# Patient Record
Sex: MALE | Race: BLACK OR AFRICAN AMERICAN | Employment: OTHER | URBAN - METROPOLITAN AREA
[De-identification: names, ages, dates, MRNs, and addresses within clinical notes are randomized per-mention and may not be internally consistent; named-entity substitution may affect disease eponyms.]

---

## 2019-03-05 ENCOUNTER — HOSPITAL ENCOUNTER (EMERGENCY)
Age: 44
Discharge: HOME | End: 2019-03-06
Attending: EMERGENCY MEDICINE
Payer: OTHER GOVERNMENT

## 2019-03-05 VITALS
DIASTOLIC BLOOD PRESSURE: 108 MMHG | BODY MASS INDEX: 28.42 KG/M2 | TEMPERATURE: 98 F | HEART RATE: 78 BPM | RESPIRATION RATE: 18 BRPM | WEIGHT: 203 LBS | OXYGEN SATURATION: 98 % | HEIGHT: 71 IN | SYSTOLIC BLOOD PRESSURE: 148 MMHG
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
## 2019-03-05 DIAGNOSIS — M54.6 ACUTE BILATERAL THORACIC BACK PAIN: ICD-10-CM

## 2019-03-05 DIAGNOSIS — L70.0 CYSTIC ACNE: ICD-10-CM

## 2019-03-05 PROCEDURE — 99282 EMERGENCY DEPT VISIT SF MDM: CPT

## 2019-03-05 RX ORDER — OLANZAPINE 15 MG/1
15 TABLET ORAL
COMMUNITY

## 2019-03-05 RX ORDER — ATORVASTATIN CALCIUM 40 MG/1
20 TABLET, FILM COATED ORAL DAILY
COMMUNITY

## 2019-03-05 RX ORDER — DIVALPROEX SODIUM 250 MG/1
750 TABLET, DELAYED RELEASE ORAL
COMMUNITY

## 2019-03-05 RX ORDER — METFORMIN HYDROCHLORIDE 500 MG/1
500 TABLET ORAL 2 TIMES DAILY WITH MEALS
COMMUNITY

## 2019-03-06 RX ORDER — IBUPROFEN 600 MG/1
600 TABLET ORAL
Qty: 20 TAB | Refills: 0 | Status: SHIPPED | OUTPATIENT
Start: 2019-03-06 | End: 2022-06-13 | Stop reason: SDUPTHER

## 2019-03-06 RX ORDER — METHOCARBAMOL 500 MG/1
750 TABLET, FILM COATED ORAL
Status: DISCONTINUED | OUTPATIENT
Start: 2019-03-06 | End: 2019-03-06 | Stop reason: HOSPADM

## 2019-03-06 RX ORDER — IBUPROFEN 600 MG/1
600 TABLET ORAL
Status: DISCONTINUED | OUTPATIENT
Start: 2019-03-06 | End: 2019-03-06 | Stop reason: HOSPADM
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
## 2022-06-12 ENCOUNTER — HOSPITAL ENCOUNTER (EMERGENCY)
Age: 47
Discharge: HOME | End: 2022-06-13
Attending: STUDENT IN AN ORGANIZED HEALTH CARE EDUCATION/TRAINING PROGRAM
Payer: OTHER GOVERNMENT

## 2022-06-12 ENCOUNTER — APPOINTMENT (OUTPATIENT)
Dept: GENERAL RADIOLOGY | Age: 47
End: 2022-06-12
Attending: STUDENT IN AN ORGANIZED HEALTH CARE EDUCATION/TRAINING PROGRAM
Payer: OTHER GOVERNMENT

## 2022-06-12 VITALS
SYSTOLIC BLOOD PRESSURE: 135 MMHG | BODY MASS INDEX: 23.8 KG/M2 | TEMPERATURE: 98.6 F | WEIGHT: 170 LBS | RESPIRATION RATE: 16 BRPM | OXYGEN SATURATION: 100 % | HEART RATE: 61 BPM | DIASTOLIC BLOOD PRESSURE: 87 MMHG | HEIGHT: 71 IN

## 2022-06-12 DIAGNOSIS — R45.851 SUICIDAL IDEATION: ICD-10-CM
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
## 2022-06-12 DIAGNOSIS — Z86.59 HISTORY OF POSTTRAUMATIC STRESS DISORDER (PTSD): ICD-10-CM

## 2022-06-12 DIAGNOSIS — U07.1 LAB TEST POSITIVE FOR DETECTION OF COVID-19 VIRUS: ICD-10-CM

## 2022-06-12 DIAGNOSIS — S62.102A CLOSED FRACTURE OF LEFT WRIST, INITIAL ENCOUNTER: Primary | ICD-10-CM

## 2022-06-12 LAB
AMPHET UR QL SCN: NEGATIVE
ANION GAP SERPL CALC-SCNC: 5 MMOL/L (ref 3–18)
APAP SERPL-MCNC: <2 UG/ML (ref 10–30)
ATRIAL RATE: 58 BPM
BARBITURATES UR QL SCN: NEGATIVE
BASOPHILS # BLD: 0 K/UL (ref 0–0.1)
BASOPHILS NFR BLD: 0 % (ref 0–2)
BENZODIAZ UR QL: NEGATIVE
BUN SERPL-MCNC: 13 MG/DL (ref 7–18)
BUN/CREAT SERPL: 13 (ref 12–20)
CALCIUM SERPL-MCNC: 9.1 MG/DL (ref 8.5–10.1)
CALCULATED P AXIS, ECG09: 61 DEGREES
CALCULATED R AXIS, ECG10: 35 DEGREES
CALCULATED T AXIS, ECG11: 24 DEGREES
CANNABINOIDS UR QL SCN: POSITIVE
CHLORIDE SERPL-SCNC: 109 MMOL/L (ref 100–111)
CO2 SERPL-SCNC: 26 MMOL/L (ref 21–32)
COCAINE UR QL SCN: NEGATIVE
COVID-19 RAPID TEST, COVR: NOT DETECTED
CREAT SERPL-MCNC: 1.04 MG/DL (ref 0.6–1.3)
DIAGNOSIS, 93000: NORMAL
DIFFERENTIAL METHOD BLD: ABNORMAL
EOSINOPHIL # BLD: 0 K/UL (ref 0–0.4)
EOSINOPHIL NFR BLD: 1 % (ref 0–5)
ERYTHROCYTE [DISTWIDTH] IN BLOOD BY AUTOMATED COUNT: 14.6 % (ref 11.6–14.5)
ETHANOL SERPL-MCNC: 7 MG/DL (ref 0–3)
GLUCOSE BLD STRIP.AUTO-MCNC: 78 MG/DL (ref 70–110)
GLUCOSE SERPL-MCNC: 180 MG/DL (ref 74–99)
HCT VFR BLD AUTO: 40.4 % (ref 36–48)
HDSCOM,HDSCOM: ABNORMAL
HGB BLD-MCNC: 13.4 G/DL (ref 13–16)
IMM GRANULOCYTES # BLD AUTO: 0 K/UL (ref 0–0.04)
IMM GRANULOCYTES NFR BLD AUTO: 0 % (ref 0–0.5)
LYMPHOCYTES # BLD: 1.2 K/UL (ref 0.9–3.6)
LYMPHOCYTES NFR BLD: 19 % (ref 21–52)
MCH RBC QN AUTO: 24.1 PG (ref 24–34)
MCHC RBC AUTO-ENTMCNC: 33.2 G/DL (ref 31–37)
MCV RBC AUTO: 72.5 FL (ref 78–100)
METHADONE UR QL: NEGATIVE
MONOCYTES # BLD: 0.6 K/UL (ref 0.05–1.2)
MONOCYTES NFR BLD: 9 % (ref 3–10)
NEUTS SEG # BLD: 4.5 K/UL (ref 1.8–8)
NEUTS SEG NFR BLD: 71 % (ref 40–73)
NRBC # BLD: 0 K/UL (ref 0–0.01)
NRBC BLD-RTO: 0 PER 100 WBC
OPIATES UR QL: NEGATIVE
P-R INTERVAL, ECG05: 164 MS
PCP UR QL: NEGATIVE
PLATELET # BLD AUTO: 223 K/UL (ref 135–420)
PMV BLD AUTO: 9.2 FL (ref 9.2–11.8)
POTASSIUM SERPL-SCNC: 4 MMOL/L (ref 3.5–5.5)
Q-T INTERVAL, ECG07: 404 MS
QRS DURATION, ECG06: 84 MS
QTC CALCULATION (BEZET), ECG08: 396 MS
RBC # BLD AUTO: 5.57 M/UL (ref 4.35–5.65)
SALICYLATES SERPL-MCNC: 2.4 MG/DL (ref 2.8–20)
SARS-COV-2, COV2: NORMAL
SODIUM SERPL-SCNC: 140 MMOL/L (ref 136–145)
SOURCE, COVRS: NORMAL
VENTRICULAR RATE, ECG03: 58 BPM
WBC # BLD AUTO: 6.3 K/UL (ref 4.6–13.2)

## 2022-06-12 PROCEDURE — 99285 EMERGENCY DEPT VISIT HI MDM: CPT

## 2022-06-12 PROCEDURE — 80143 DRUG ASSAY ACETAMINOPHEN: CPT

## 2022-06-12 PROCEDURE — 82962 GLUCOSE BLOOD TEST: CPT

## 2022-06-12 PROCEDURE — 74011250637 HC RX REV CODE- 250/637: Performed by: EMERGENCY MEDICINE

## 2022-06-12 PROCEDURE — 75810000053 HC SPLINT APPLICATION

## 2022-06-12 PROCEDURE — 87635 SARS-COV-2 COVID-19 AMP PRB: CPT

## 2022-06-12 PROCEDURE — 73110 X-RAY EXAM OF WRIST: CPT

## 2022-06-12 PROCEDURE — U0003 INFECTIOUS AGENT DETECTION BY NUCLEIC ACID (DNA OR RNA); SEVERE ACUTE RESPIRATORY SYNDROME CORONAVIRUS 2 (SARS-COV-2) (CORONAVIRUS DISEASE [COVID-19]), AMPLIFIED PROBE TECHNIQUE, MAKING USE OF HIGH THROUGHPUT TECHNOLOGIES AS DESCRIBED BY CMS-2020-01-R: HCPCS

## 2022-06-12 PROCEDURE — 82077 ASSAY SPEC XCP UR&BREATH IA: CPT

## 2022-06-12 PROCEDURE — 93005 ELECTROCARDIOGRAM TRACING: CPT

## 2022-06-12 PROCEDURE — 80179 DRUG ASSAY SALICYLATE: CPT

## 2022-06-12 PROCEDURE — 80048 BASIC METABOLIC PNL TOTAL CA: CPT

## 2022-06-12 PROCEDURE — 74011250637 HC RX REV CODE- 250/637: Performed by: STUDENT IN AN ORGANIZED HEALTH CARE EDUCATION/TRAINING PROGRAM

## 2022-06-12 PROCEDURE — 80307 DRUG TEST PRSMV CHEM ANLYZR: CPT

## 2022-06-12 PROCEDURE — 2709999900 HC NON-CHARGEABLE SUPPLY

## 2022-06-12 PROCEDURE — 85025 COMPLETE CBC W/AUTO DIFF WBC: CPT

## 2022-06-12 RX ORDER — ACETAMINOPHEN 500 MG
1000 TABLET ORAL ONCE
Status: COMPLETED | OUTPATIENT
Start: 2022-06-12 | End: 2022-06-12

## 2022-06-12 RX ORDER — IBUPROFEN 600 MG/1
600 TABLET ORAL
Status: COMPLETED | OUTPATIENT
Start: 2022-06-12 | End: 2022-06-12

## 2022-06-12 RX ORDER — ACETAMINOPHEN 500 MG
1000 TABLET ORAL
Status: COMPLETED | OUTPATIENT
Start: 2022-06-12 | End: 2022-06-12

## 2022-06-12 RX ADMIN — ACETAMINOPHEN 1000 MG: 500 TABLET ORAL at 07:50

## 2022-06-12 RX ADMIN — IBUPROFEN 600 MG: 600 TABLET ORAL at 07:50

## 2022-06-12 RX ADMIN — ACETAMINOPHEN 1000 MG: 500 TABLET ORAL at 22:41

## 2022-06-12 RX ADMIN — OLANZAPINE 15 MG: 10 TABLET, FILM COATED ORAL at 22:41
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
## 2022-06-12 NOTE — PROGRESS NOTES
1530- No accepting facilities at this time. 1330-  Cm reached out to [address] spoke with  Diego, elkin was informed that facility is not accepting patients to their psych unit due to Flu outbreak for 7 days, facility is on day 4.  contacted by ED for voluntary inpt psych placement. If at any time Patient becomes involuntary- ED will need to contact Police for a paperless ECO (Emergency Custody Order) who will then call [facility] directly to assist with TDO as needed. Elkin visited pt at bedside to introduce self and explain cm role with voluntary psych inpatient placement, pt agreeable stated he has went to several different inpatient psych facilities and will to go to [facility] or another that will accept, pt does have a left arm splint wrapped with a ace bandage that can make placement a little challenging.  will contact all facilities and they will contact ED directly for questions or acceptances. CM does not need to be notified by staff once bed confirmed to ED by facility. Once all facilities have been contacted should a bed not be available through today. CM will resume search in the morning and continue to work toward transition of care. ELKIN contacted and [facility]  [facility], for review- MSN # provided to Nurse Sargent.     ELKIN contacted and provided MRN  To [facility], and [facility] for review       CM faxed clinical information to Louis Lockett for review    CM faxed clinical information to [facility] for review    CM faxed clinical information to [facility] for review     CM faxed clinical information to [facility]  for review    CM faxed clinical information to [facility]  for review    CM faxed clinical information to Addie Lyons for review     CM faxed clinical information to [facility]  for review    CM faxed clinical information to [facility] for review    CM [facility], [address] [place] [address], [facility], Darell Cannon  for review    CM faxed clinical information to [facility] for review     CM faxed clinical information to [facility]  for review    CM faxed clinical information to [facility]  for review    CM faxed clinical information to  [facility] for review     CM faxed clinical information to [facility]   for review    CM faxed clinical information to [facility] for review     CM faxed clinical information to [facility]  for review    CM faxed clinical information to [facility]  for review    CM faxed clinical information to [facility] for review     CM faxed clinical information to [facility] for review    CM faxed clinical information to Aaron Alexander for review    CM faxed clinical information to Leon Tijerina for review    CM faxed clinical information to [facility]  for review
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
## 2022-06-13 LAB — SARS-COV-2, NAA: DETECTED

## 2022-06-13 PROCEDURE — 74011250637 HC RX REV CODE- 250/637: Performed by: EMERGENCY MEDICINE

## 2022-06-13 RX ORDER — ACETAMINOPHEN 325 MG/1
975 TABLET ORAL
Status: COMPLETED | OUTPATIENT
Start: 2022-06-13 | End: 2022-06-13

## 2022-06-13 RX ORDER — IBUPROFEN 600 MG/1
600 TABLET ORAL
Qty: 20 TABLET | Refills: 0 | Status: SHIPPED | OUTPATIENT
Start: 2022-06-13

## 2022-06-13 RX ORDER — BENZONATATE 100 MG/1
100 CAPSULE ORAL
Qty: 30 CAPSULE | Refills: 0 | Status: SHIPPED | OUTPATIENT
Start: 2022-06-13 | End: 2022-06-20

## 2022-06-13 RX ORDER — ONDANSETRON 4 MG/1
4 TABLET, FILM COATED ORAL
Qty: 12 TABLET | Refills: 0 | Status: SHIPPED | OUTPATIENT
Start: 2022-06-13

## 2022-06-13 RX ORDER — OXYCODONE AND ACETAMINOPHEN 5; 325 MG/1; MG/1
1 TABLET ORAL
Qty: 20 TABLET | Refills: 0 | Status: SHIPPED | OUTPATIENT
Start: 2022-06-13 | End: 2022-06-20

## 2022-06-13 RX ORDER — PREDNISONE 20 MG/1
60 TABLET ORAL DAILY
Qty: 15 TABLET | Refills: 0 | Status: SHIPPED | OUTPATIENT
Start: 2022-06-13 | End: 2022-06-18

## 2022-06-13 RX ADMIN — ACETAMINOPHEN 975 MG: 325 TABLET ORAL at 18:13
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
## 2022-06-13 NOTE — PROGRESS NOTES
Please note patient's PCR is positive. Please continue to reassess patient for SI. No accepting facilities at this time. CM continues the search today.  contacted by ED for voluntary inpt psych placement. If at any time Patient becomes involuntary- ED will need to contact Police for a paperless ECO (Emergency Custody Order) who will then call [facility] directly to assist with TDO as needed.  will contact all facilities and they will contact ED directly for questions or acceptances. CM does not need to be notified by staff once bed confirmed to ED by facility. Once all facilities have been contacted should a bed not be available through today. CM will resume search in the morning and continue to work toward transition of care.           CM contacted and provided [facility]N  [facility], for review-no beds available but will update if they have discharges    CM contacted and provided MRN  To [facility] and [facility] for review       CM faxed clinical information to [facility] for review    CM faxed clinical information to [facility] for review    CM faxed clinical information to [address] [place] [address] for review     CM faxed clinical information to [facility]  for review    CM faxed clinical information to [facility]  for review    CM faxed clinical information to [facility] for review     CM faxed clinical information to [facility]  for review    CM faxed clinical information to [facility] for review    CM faxed clinical information to Dana Myers, [facility], [address] [place], [place] [address]Charity  for review    CM faxed clinical information to [facility] for review     CM faxed clinical information to [facility]  for review    CM faxed clinical information to [facility]  for review    CM faxed clinical information to  [facility] for review     CM faxed clinical information to [facility]   for review    CM faxed clinical information to [facility] for review     CM faxed clinical information to [facility]  for review    CM faxed clinical information to [facility]  for review    CM faxed clinical information to [facility] for review     CM faxed clinical information to [facility] for review    CM faxed clinical information to [facility] for review    CM faxed clinical information to [facility] for review    CM faxed clinical information to [facility]  for review-[facility] called, can not take patient due to cast.     CM noted that patient's Covid PCR is negative.

## 2022-06-13 NOTE — ED NOTES
Spoke with Alicia Archibald with care management. She advised me the patient tested positive for Covid.  RN  To update the patient per patient request.

## 2022-06-13 NOTE — ED NOTES
PT in sandoval pacing states he wants to leave AMA. RN enters room to talk to the patient to see what he requests. Pt appears very anxious on the phone, started telling RN  \"My friends told me I should leave here immediately and go home\" RN tries to calm the patient and ask what caused him to become upset. Pt attempts at least 3 phone calls finally talks to someone RN asks pt to talk to her and pt puts phone on speaker the person identifies themself as his  and states \"He is free to leave release him now. He can be discharged, this is my Bar number\". RN advised that she would have to do the legal process as I was going to talk to the MD. Pt sweating, very agitated. MD Aram Baxter comes to bedside taking to the patient.

## 2022-06-13 NOTE — ED NOTES
Pts belongings at charge desk, He has sweat suit, shelby pack denies wanting anything stored with security. Pt denies any valuables. Pt in blue hospital scrubs. Pt has care items deodorant/mouthwash at bedside. Room check completed.

## 2022-06-13 NOTE — ED NOTES
I had a lengthy talk with Mr. Marlee Burrell. He is ANO x4. He tells me he originally requested placement to help him rest and get over his stressors however he clearly denies HI or SI. He is COVID-positive with only minimal cough productive sputum. His lungs are absolutely clear. He has good access to the [place] and his psychiatrist's not only through the [facility] system but also there was phone which she showed me. We will discharge him with outpatient follow-up with his psychiatrist and I will provide him medications for COVID cough as well as his broken arm.     Toy Valdez MD

## 2022-11-18 ENCOUNTER — HOSPITAL ENCOUNTER (EMERGENCY)
Age: 47
Discharge: HOME | End: 2022-11-18
Attending: EMERGENCY MEDICINE
Payer: OTHER GOVERNMENT

## 2022-11-18 ENCOUNTER — APPOINTMENT (OUTPATIENT)
Dept: GENERAL RADIOLOGY | Age: 47
End: 2022-11-18
Attending: PHYSICIAN ASSISTANT
Payer: OTHER GOVERNMENT

## 2022-11-18 VITALS
WEIGHT: 185 LBS | BODY MASS INDEX: 25.9 KG/M2 | DIASTOLIC BLOOD PRESSURE: 92 MMHG | RESPIRATION RATE: 18 BRPM | SYSTOLIC BLOOD PRESSURE: 131 MMHG | HEIGHT: 71 IN | HEART RATE: 86 BPM | TEMPERATURE: 97.9 F

## 2022-11-18 DIAGNOSIS — Z87.81 HISTORY OF WRIST FRACTURE: ICD-10-CM

## 2022-11-18 DIAGNOSIS — L73.1 PSEUDOFOLLICULITIS BARBAE: Primary | ICD-10-CM

## 2022-11-18 DIAGNOSIS — S63.502A LEFT WRIST SPRAIN, INITIAL ENCOUNTER: ICD-10-CM

## 2022-11-18 PROCEDURE — 99283 EMERGENCY DEPT VISIT LOW MDM: CPT

## 2022-11-18 PROCEDURE — 73110 X-RAY EXAM OF WRIST: CPT

## 2022-11-18 RX ORDER — METHYLPREDNISOLONE 4 MG/1
TABLET ORAL
Qty: 1 DOSE PACK | Refills: 0 | Status: SHIPPED | OUTPATIENT
Start: 2022-11-18

## 2022-11-18 RX ORDER — DOXYCYCLINE HYCLATE 100 MG
100 TABLET ORAL 2 TIMES DAILY
Qty: 20 TABLET | Refills: 0 | Status: SHIPPED | OUTPATIENT
Start: 2022-11-18 | End: 2022-11-28
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
## 2022-11-19 NOTE — ED PROVIDER NOTES
EMERGENCY DEPARTMENT HISTORY AND PHYSICAL EXAM    Date: 11/18/2022  Patient Name: Janie Rice. History of Presenting Illness     Chief Complaint   Patient presents with    Wrist Pain    Facial Swelling         History Provided By: Patient    Chief Complaint: wrist pain, facial rash    HPI(Context):   9:35 PM  Janie Jean is a 56 y.o. male with PMHX of HTN, HLP, bipolar, depression, PTSD, pseudofolliculitis barbae, who presents to the emergency department C/O rash to face. Associated sxs include itching and swelling. Pt notes hx of pseudofolliculitis barbae. Pt had surgery with plastics in past but this began to flare up over last several weeks. Pt uses giovanna and does not shave with blade. Pt also notes pain to left wrist. Hx of distal radial and ulnar fx in 06/2022. No new trauma. Pt endorses lifting at work. Pain to wrist worse with movement. Pt denies numbness, weakness, drainage, and any other sxs or complaints. PCP: Other, MD Dixon    Current Outpatient Medications   Medication Sig Dispense Refill    methylPREDNISolone (Medrol, Chidi,) 4 mg tablet Take with food. 1 Dose Pack 0    doxycycline (VIBRA-TABS) 100 mg tablet Take 1 Tablet by mouth two (2) times a day for 10 days. 20 Tablet 0    ibuprofen (MOTRIN) 600 mg tablet Take 1 Tablet by mouth every six (6) hours as needed for Pain. 20 Tablet 0    ondansetron hcl (Zofran) 4 mg tablet Take 1 Tablet by mouth every eight (8) hours as needed for Nausea. 12 Tablet 0    guaiFENesin-dextromethorphan SR (Mucinex DM) 600-30 mg per tablet Take 1 Tablet by mouth two (2) times a day. 30 Tablet 0    divalproex DR (DEPAKOTE) 250 mg tablet Take 750 mg by mouth nightly. metFORMIN (GLUCOPHAGE) 500 mg tablet Take 500 mg by mouth two (2) times daily (with meals). OLANZapine (ZYPREXA) 15 mg tablet Take 15 mg by mouth nightly. atorvastatin (LIPITOR) 40 mg tablet Take 20 mg by mouth daily.          Past History     Past Medical History:  Past Medical History:   Diagnosis Date    Bipolar 1 disorder ([facility]     Depression     Diabetes ([facility]     Hypercholesteremia     Hypertension     PTSD (post-traumatic stress disorder)        Past Surgical History:  No past surgical history on file. Family History:  No family history on file. Social History:  Social History     Tobacco Use    Smoking status: Never    Smokeless tobacco: Never   Substance Use Topics    Alcohol use: No    Drug use: No       Allergies:  No Known Allergies      Review of Systems   Review of Systems   HENT:  Negative for facial swelling. Musculoskeletal:  Positive for arthralgias and joint swelling. Skin:  Positive for rash. Neurological:  Negative for weakness and numbness. All other systems reviewed and are negative. Physical Exam     Vitals:    11/18/22 2040   BP: (!) 131/92   Pulse: 86   Resp: 18   Temp: 97.9 °F (36.6 °C)   Weight: 83.9 kg (185 lb)   Height: 5' 11\" (1.803 m)     Physical Exam  Vitals and nursing note reviewed. Constitutional:       General: He is not in acute distress. Appearance: He is well-developed. He is not diaphoretic. Comments: AA male in NAD. Alert. Appears comfortable. In RW. SO at bedside. HENT:      Head: Normocephalic and atraumatic. Comments: Papular and pustular rash to beard with no swelling. No abscess     Right Ear: External ear normal.      Left Ear: External ear normal.      Nose: Nose normal.   Eyes:      General: No scleral icterus. Right eye: No discharge. Left eye: No discharge. Conjunctiva/sclera: Conjunctivae normal.   Cardiovascular:      Rate and Rhythm: Normal rate and regular rhythm. Heart sounds: Normal heart sounds. No murmur heard. No friction rub. No gallop. Pulmonary:      Effort: Pulmonary effort is normal. No tachypnea, accessory muscle usage or respiratory distress. Breath sounds: Normal breath sounds. No decreased breath sounds, wheezing, rhonchi or rales.    Musculoskeletal: General: Normal range of motion. Right forearm: Normal.      Left forearm: Normal.      Right wrist: Normal pulse. Left wrist: Tenderness present. No swelling or deformity. Normal range of motion. Normal pulse. Right hand: Normal capillary refill. Normal pulse. Left hand: Normal pulse. Cervical back: Normal range of motion. Skin:     General: Skin is warm and dry. Neurological:      Mental Status: He is alert and oriented to person, place, and time. Psychiatric:         Judgment: Judgment normal.           Diagnostic Study Results     Labs -   No results found for this or any previous visit (from the past 12 hour(s)). Radiologic Studies -   9:35 PM  RADIOLOGY FINDINGS  Left wrist x-ray shows NAP  Pending review by Radiologist  Recorded by Jt Brihgt PA-C    XR WRIST LT AP/LAT/OBL MIN 3V    (Results Pending)     CT Results  (Last 48 hours)      None          CXR Results  (Last 48 hours)      None            Medications given in the ED-  Medications - No data to display      Medical Decision Making   I am the first provider for this patient. I reviewed the vital signs, available nursing notes, past medical history, past surgical history, family history and social history. Vital Signs-Reviewed the patient's vital signs. Records Reviewed: Nursing Notes and Old Medical Records    Provider Notes (Medical Decision Making): pseudofolliculitis barbae, cellulitis, dermatitis, tinea, rosacea, eczema, psoraisis    Sprain, strain, fx, bursitis, ligamentous, CTS    Procedures:  Procedures    ED Course:   9:35 PM Initial assessment performed. The patients presenting problems have been discussed, and they are in agreement with the care plan formulated and outlined with them. I have encouraged them to ask questions as they arise throughout their visit. Diagnosis and Disposition       Will tx for pseudofolliculitis barbae with doxy. Warm compresses. Allow beard to grow.  Pt also with left wrist pain with hx of fx. NVI. Will place in velcro splint. Ortho FU. Reasons to RTED discussed with pt. All questions answered. Pt feels comfortable going home at this time. Pt expressed understanding and he agrees with plan. 1. Pseudofolliculitis barbae    2. Left wrist sprain, initial encounter    3. History of wrist fracture        PLAN:  1. D/C Home  2. Discharge Medication List as of 11/18/2022 10:52 PM        START taking these medications    Details   methylPREDNISolone (Medrol, Chidi,) 4 mg tablet Take with food., Normal, Disp-1 Dose Pack, R-0      doxycycline (VIBRA-TABS) 100 mg tablet Take 1 Tablet by mouth two (2) times a day for 10 days. , Normal, Disp-20 Tablet, R-0           CONTINUE these medications which have NOT CHANGED    Details   ibuprofen (MOTRIN) 600 mg tablet Take 1 Tablet by mouth every six (6) hours as needed for Pain., Print, Disp-20 Tablet, R-0      ondansetron hcl (Zofran) 4 mg tablet Take 1 Tablet by mouth every eight (8) hours as needed for Nausea. , Print, Disp-12 Tablet, R-0      guaiFENesin-dextromethorphan SR (Mucinex DM) 600-30 mg per tablet Take 1 Tablet by mouth two (2) times a day., Print, Disp-30 Tablet, R-0      divalproex DR (DEPAKOTE) 250 mg tablet Take 750 mg by mouth nightly., Historical Med      metFORMIN (GLUCOPHAGE) 500 mg tablet Take 500 mg by mouth two (2) times daily (with meals). , Historical Med      OLANZapine (ZYPREXA) 15 mg tablet Take 15 mg by mouth nightly., Historical Med      atorvastatin (LIPITOR) 40 mg tablet Take 20 mg by mouth daily. , Historical Med           3. Follow-up Information       Follow up With Specialties Details Why Manny Pool [id number] [phone]    THE [facility] EMERGENCY DEPT Emergency Medicine   [address] [place] [address]  [phone]          _______________________________    Attestations:   This note is prepared by Mike Pratt, OMAR.  _______________________________      Please note that this dictation was completed with [employer], the computer voice recognition software. Quite often unanticipated grammatical, syntax, homophones, and other interpretive errors are inadvertently transcribed by the computer software. Please disregard these errors. Please excuse any errors that have escaped final proofreading.

## 2022-11-19 NOTE — ED PROVIDER NOTES
EMERGENCY DEPARTMENT HISTORY AND PHYSICAL EXAM    Date: 11/18/2022  Patient Name: Anthony Villalpando. History of Presenting Illness     Chief Complaint   Patient presents with    Wrist Pain    Facial Swelling         History Provided By: Patient    Chief Complaint: wrist pain, facial rash    HPI(Context):   9:35 PM  Anthony Jose is a 56 y.o. male with PMHX of HTN, HLP, PTSD, bipolar who presents to the emergency department C/O rash to face. Pt has hx of pseudofolliculitis barbae. Pt notes he has itching and swelling with tender lesions that bleed around chin. Pt used giovanna and does not shave. Pt had plastic surgeon performs surgery on face in past due to same condition. Pt also notes pain in left wrist x one week. Pt has hx of fx of distal radiul and ulnar styloid in 06/2022. Pain is worse with movement. Pt denies new trauma. Pt denies numbness, weakness, and any other sxs or complaints. PCP: Other, MD Dixon    Current Outpatient Medications   Medication Sig Dispense Refill    methylPREDNISolone (Medrol, Chidi,) 4 mg tablet Take with food. 1 Dose Pack 0    doxycycline (VIBRA-TABS) 100 mg tablet Take 1 Tablet by mouth two (2) times a day for 10 days. 20 Tablet 0    ibuprofen (MOTRIN) 600 mg tablet Take 1 Tablet by mouth every six (6) hours as needed for Pain. 20 Tablet 0    ondansetron hcl (Zofran) 4 mg tablet Take 1 Tablet by mouth every eight (8) hours as needed for Nausea. 12 Tablet 0    guaiFENesin-dextromethorphan SR (Mucinex DM) 600-30 mg per tablet Take 1 Tablet by mouth two (2) times a day. 30 Tablet 0    divalproex DR (DEPAKOTE) 250 mg tablet Take 750 mg by mouth nightly.  metFORMIN (GLUCOPHAGE) 500 mg tablet Take 500 mg by mouth two (2) times daily (with meals).  OLANZapine (ZYPREXA) 15 mg tablet Take 15 mg by mouth nightly.  atorvastatin (LIPITOR) 40 mg tablet Take 20 mg by mouth daily.          Past History     Past Medical History:  Past Medical History:   Diagnosis Date  B[facility] disorder ([facility]     Depression     Diabetes ([facility]     Hypercholesteremia     Hypertension     PTSD (post-traumatic stress disorder)        Past Surgical History:  No past surgical history on file. Family History:  No family history on file. Social History:  Social History     Tobacco Use    Smoking status: Never    Smokeless tobacco: Never   Substance Use Topics    Alcohol use: No    Drug use: No       Allergies:  No Known Allergies      Review of Systems   Review of Systems   Musculoskeletal:  Positive for arthralgias. Negative for joint swelling. Physical Exam     Vitals:    11/18/22 2040   BP: (!) 131/92   Pulse: 86   Resp: 18   Temp: 97.9 °F (36.6 °C)   Weight: 83.9 kg (185 lb)   Height: 5' 11\" (1.803 m)     Physical Exam    ***    Diagnostic Study Results     Labs -   No results found for this or any previous visit (from the past 12 hour(s)). Radiologic Studies - ***  9:35 PM  RADIOLOGY FINDINGS  *** X-ray shows ***  Pending review by Radiologist  Recorded by ***, ED Scribe, as dictated by ***    XR WRIST LT AP/LAT/OBL MIN 3V    (Results Pending)     CT Results  (Last 48 hours)      None          CXR Results  (Last 48 hours)      None            Medications given in the ED-  Medications - No data to display      Medical Decision Making   I am the first provider for this patient. I reviewed the vital signs, available nursing notes, past medical history, past surgical history, family history and social history. Vital Signs-Reviewed the patient's vital signs. Pulse Oximetry Analysis - ***% on ***     Cardiac Monitor:  Rate: *** bpm  Rhythm: ***    EKG interpretation: (Preliminary)  9:35 PM   ***  EKG read by *** at ***     Records Reviewed: {[email or web address]:29287}    Provider Notes (Medical Decision Making): ***    Procedures:  Procedures    ED Course:   9:35 PM Initial assessment performed.  The patients presenting problems have been discussed, and they are in agreement with the care plan formulated and outlined with them. I have encouraged them to ask questions as they arise throughout their visit. Diagnosis and Disposition       ***      1. Pseudofolliculitis barbae    2. Left wrist sprain, initial encounter    3. History of wrist fracture        PLAN:  1. D/C Home  2. Discharge Medication List as of 11/18/2022 10:52 PM        START taking these medications    Details   methylPREDNISolone (Medrol, Chidi,) 4 mg tablet Take with food., Normal, Disp-1 Dose Pack, R-0      doxycycline (VIBRA-TABS) 100 mg tablet Take 1 Tablet by mouth two (2) times a day for 10 days. , Normal, Disp-20 Tablet, R-0           CONTINUE these medications which have NOT CHANGED    Details   ibuprofen (MOTRIN) 600 mg tablet Take 1 Tablet by mouth every six (6) hours as needed for Pain., Print, Disp-20 Tablet, R-0      ondansetron hcl (Zofran) 4 mg tablet Take 1 Tablet by mouth every eight (8) hours as needed for Nausea. , Print, Disp-12 Tablet, R-0      guaiFENesin-dextromethorphan SR (Mucinex DM) 600-30 mg per tablet Take 1 Tablet by mouth two (2) times a day., Print, Disp-30 Tablet, R-0      divalproex DR (DEPAKOTE) 250 mg tablet Take 750 mg by mouth nightly., Historical Med      metFORMIN (GLUCOPHAGE) 500 mg tablet Take 500 mg by mouth two (2) times daily (with meals). , Historical Med      OLANZapine (ZYPREXA) 15 mg tablet Take 15 mg by mouth nightly., Historical Med      atorvastatin (LIPITOR) 40 mg tablet Take 20 mg by mouth daily. , Historical Med           3. Follow-up Information       Follow up With Specialties Details Why Manny Ramsey [id number] [phone]    [facility] EMERGENCY DEPT Emergency Medicine   [address] [place] [address]  [phone]          _______________________________    Attestations:   This note is prepared by Jared Alford PA-C.  _______________________________      *** PLEASE DO NOT SIGN THIS CHART    Please note that this dictation was completed with [employer] the [employer] voice recognition software. Quite often unanticipated grammatical, syntax, homophones, and other interpretive errors are inadvertently transcribed by the computer software. Please disregard these errors. Please excuse any errors that have escaped final proofreading.